# Patient Record
Sex: FEMALE | Race: OTHER | HISPANIC OR LATINO | URBAN - METROPOLITAN AREA
[De-identification: names, ages, dates, MRNs, and addresses within clinical notes are randomized per-mention and may not be internally consistent; named-entity substitution may affect disease eponyms.]

---

## 2017-04-16 ENCOUNTER — EMERGENCY (EMERGENCY)
Facility: HOSPITAL | Age: 57
LOS: 1 days | Discharge: HOME | End: 2017-04-16
Admitting: EMERGENCY MEDICINE
Payer: COMMERCIAL

## 2017-04-16 PROCEDURE — 99285 EMERGENCY DEPT VISIT HI MDM: CPT

## 2017-04-16 PROCEDURE — 71010: CPT | Mod: 26

## 2017-04-16 PROCEDURE — 93010 ELECTROCARDIOGRAM REPORT: CPT

## 2022-11-22 ENCOUNTER — OFFICE (OUTPATIENT)
Dept: URBAN - METROPOLITAN AREA CLINIC 63 | Facility: CLINIC | Age: 62
Setting detail: OPHTHALMOLOGY
End: 2022-11-22
Payer: MEDICAID

## 2022-11-22 DIAGNOSIS — H52.4: ICD-10-CM

## 2022-11-22 DIAGNOSIS — H02.831: ICD-10-CM

## 2022-11-22 DIAGNOSIS — H25.13: ICD-10-CM

## 2022-11-22 DIAGNOSIS — H02.834: ICD-10-CM

## 2022-11-22 DIAGNOSIS — H40.013: ICD-10-CM

## 2022-11-22 PROCEDURE — 92004 COMPRE OPH EXAM NEW PT 1/>: CPT | Performed by: STUDENT IN AN ORGANIZED HEALTH CARE EDUCATION/TRAINING PROGRAM

## 2022-11-22 PROCEDURE — 92133 CPTRZD OPH DX IMG PST SGM ON: CPT | Performed by: STUDENT IN AN ORGANIZED HEALTH CARE EDUCATION/TRAINING PROGRAM

## 2022-11-22 PROCEDURE — 92015 DETERMINE REFRACTIVE STATE: CPT | Performed by: STUDENT IN AN ORGANIZED HEALTH CARE EDUCATION/TRAINING PROGRAM

## 2022-11-22 ASSESSMENT — REFRACTION_AUTOREFRACTION
OS_CYLINDER: -0.25
OD_AXIS: 096
OD_CYLINDER: -1.00
OS_AXIS: 134
OS_SPHERE: +3.75
OD_SPHERE: +4.25

## 2022-11-22 ASSESSMENT — AXIALLENGTH_DERIVED
OS_AL: 22.6809
OS_AL: 22.5912
OD_AL: 22.6334
OD_AL: 22.5887

## 2022-11-22 ASSESSMENT — REFRACTION_MANIFEST
OD_VA1: 20/20
OD_CYLINDER: -0.75
OS_SPHERE: +3.50
OS_AXIS: 135
OS_CYLINDER: -0.25
OD_ADD: +2.50
OS_VA1: 20/20
OU_VA: 20/20
OS_ADD: +2.50
OD_AXIS: 095
OD_SPHERE: +4.00

## 2022-11-22 ASSESSMENT — KERATOMETRY
OS_AXISANGLE_DEGREES: 026
OD_K2POWER_DIOPTERS: 42.75
OS_K2POWER_DIOPTERS: 42.75
OD_AXISANGLE_DEGREES: 042
OD_K1POWER_DIOPTERS: 42.00
OS_K1POWER_DIOPTERS: 42.25

## 2022-11-22 ASSESSMENT — REFRACTION_CURRENTRX
OD_ADD: +2.00
OD_AXIS: 102
OS_OVR_VA: 20/
OD_OVR_VA: 20/
OS_SPHERE: +4.25
OD_SPHERE: +4.00
OS_AXIS: 083
OS_ADD: +2.00
OS_CYLINDER: -0.75
OD_CYLINDER: -0.75

## 2022-11-22 ASSESSMENT — LID POSITION - DERMATOCHALASIS
OS_DERMATOCHALASIS: LUL 2+
OD_DERMATOCHALASIS: RUL 2+

## 2022-11-22 ASSESSMENT — CONFRONTATIONAL VISUAL FIELD TEST (CVF)
OD_FINDINGS: FULL
OS_FINDINGS: FULL

## 2022-11-22 ASSESSMENT — SPHEQUIV_DERIVED
OS_SPHEQUIV: 3.375
OS_SPHEQUIV: 3.625
OD_SPHEQUIV: 3.75
OD_SPHEQUIV: 3.625

## 2022-11-22 ASSESSMENT — VISUAL ACUITY
OS_BCVA: 20/20
OD_BCVA: 20/20

## 2022-11-22 ASSESSMENT — TONOMETRY
OD_IOP_MMHG: 18
OS_IOP_MMHG: 17

## 2023-02-07 ENCOUNTER — APPOINTMENT (OUTPATIENT)
Dept: ORTHOPEDIC SURGERY | Facility: CLINIC | Age: 63
End: 2023-02-07
Payer: MEDICAID

## 2023-02-07 VITALS
SYSTOLIC BLOOD PRESSURE: 160 MMHG | DIASTOLIC BLOOD PRESSURE: 85 MMHG | HEIGHT: 63 IN | WEIGHT: 174 LBS | TEMPERATURE: 97.9 F | HEART RATE: 83 BPM | BODY MASS INDEX: 30.83 KG/M2

## 2023-02-07 PROCEDURE — 73080 X-RAY EXAM OF ELBOW: CPT | Mod: RT

## 2023-02-07 PROCEDURE — 99205 OFFICE O/P NEW HI 60 MIN: CPT

## 2023-02-07 NOTE — CONSULT LETTER
[Dear  ___] : Dear  [unfilled], [Consult Letter:] : I had the pleasure of evaluating your patient, [unfilled]. [Please see my note below.] : Please see my note below. [Consult Closing:] : Thank you very much for allowing me to participate in the care of this patient.  If you have any questions, please do not hesitate to contact me. [Sincerely,] : Sincerely, [FreeTextEntry2] : Quinton Hawley MD\par [address] [place] [address], [address]\[place] [address]\par  [FreeTextEntry3] : Burton Watt MD

## 2023-02-07 NOTE — ASSESSMENT
[FreeTextEntry1] : ASSESSMENT:\par \par The patient comes in today with chronic exacerbated symptoms of right proximal forearm mass that is causing significant physical and emotional discomfort to her.\par [I have diagnosed the patient today with a new diagnosis - This may diminish bodily function for the extremity.] \par \par [For this I was able to review other physician’s note(s) including reviewing other tests, imaging results as well as personally view these results for my own interpretation.]\par \par The patient was adequately and thoroughly informed of my assessment of their current condition(s). \par \par Surgical discussion\par \par We had a thorough discussion regarding her current symptoms and complaints.  She has a growing mass that she wishes to be removed.  We discussed removal of the mass versus observing it.  At this point she would like for it to be removed.  I would remove it but also I mentioned sending it to the pathologist.  We talked about benign versus malignant lesions.  At this point we also talked about the need for amputation and other limb salvage surgeries if this turns out to be a malignant lesion. \par \par We talked about recurrence of the mass as a possibility.  We talked about the need for secondary surgery. \par \par \par Surgical Consent Discussion:\par \par I explained the risks and benefits of surgical intervention to the patient. The risks include, but are not limited to: pain, infection, swelling, stiffness, injury to underlying neurovascular structures, scar sensitivity, incomplete resolution of symptoms, the possibility of the need for future surgery and finally the need to comply with a post-operative occupational therapy protocol. The patient understands, agrees and informed consent was obtained. The patient’s surgery will be scheduled in the near future.\par \par DISCUSSION:\par 1.  She has elected to proceed with right proximal forearm mass excisional biopsy\par \par

## 2023-02-07 NOTE — PHYSICAL EXAM
[de-identified] : She is well-appearing on examination\par \par Examination of the right forearm adjacent to the elbow reveals a palpable lesion at the posterior aspect of the proximal ulna which appears deep and rubbery.  This causes severe discomfort upon even slight touch. [de-identified] : 3 views of right elbow were obtained today in my office and were seen by me and discussed with the patient. \par These show findings consistent with mild ulnohumeral osteoarthritis and posterior olecranon traction spur\par \par I have reviewed an outside MRI or MRI ordered by a separate provider denoting a right proximal forearm lesion that appears homogenous\par

## 2023-02-07 NOTE — HISTORY OF PRESENT ILLNESS
[FreeTextEntry1] : Josefina is a very pleasant 62-year-old female who has been struggling with worsening bright elbow and forearm pain for greater than a year now.  She complains of a growing mass that is exquisitely tender for her.

## 2023-03-16 ENCOUNTER — OUTPATIENT (OUTPATIENT)
Dept: OUTPATIENT SERVICES | Facility: HOSPITAL | Age: 63
LOS: 1 days | End: 2023-03-16
Payer: MEDICAID

## 2023-03-16 DIAGNOSIS — Z01.818 ENCOUNTER FOR OTHER PREPROCEDURAL EXAMINATION: ICD-10-CM

## 2023-03-16 LAB
A1C WITH ESTIMATED AVERAGE GLUCOSE RESULT: 5.9 % — HIGH (ref 4–5.6)
ANION GAP SERPL CALC-SCNC: 9 MMOL/L — SIGNIFICANT CHANGE UP (ref 5–17)
APTT BLD: 28.3 SEC — SIGNIFICANT CHANGE UP (ref 27.5–35.5)
BASOPHILS # BLD AUTO: 0.03 K/UL — SIGNIFICANT CHANGE UP (ref 0–0.2)
BASOPHILS NFR BLD AUTO: 0.5 % — SIGNIFICANT CHANGE UP (ref 0–2)
BUN SERPL-MCNC: 22.3 MG/DL — HIGH (ref 8–20)
CALCIUM SERPL-MCNC: 9.5 MG/DL — SIGNIFICANT CHANGE UP (ref 8.4–10.5)
CHLORIDE SERPL-SCNC: 105 MMOL/L — SIGNIFICANT CHANGE UP (ref 96–108)
CO2 SERPL-SCNC: 27 MMOL/L — SIGNIFICANT CHANGE UP (ref 22–29)
CREAT SERPL-MCNC: 0.81 MG/DL — SIGNIFICANT CHANGE UP (ref 0.5–1.3)
EGFR: 82 ML/MIN/1.73M2 — SIGNIFICANT CHANGE UP
EOSINOPHIL # BLD AUTO: 0.07 K/UL — SIGNIFICANT CHANGE UP (ref 0–0.5)
EOSINOPHIL NFR BLD AUTO: 1.2 % — SIGNIFICANT CHANGE UP (ref 0–6)
ESTIMATED AVERAGE GLUCOSE: 123 MG/DL — HIGH (ref 68–114)
GLUCOSE SERPL-MCNC: 106 MG/DL — HIGH (ref 70–99)
HCT VFR BLD CALC: 39.1 % — SIGNIFICANT CHANGE UP (ref 34.5–45)
HGB BLD-MCNC: 12.9 G/DL — SIGNIFICANT CHANGE UP (ref 11.5–15.5)
IMM GRANULOCYTES NFR BLD AUTO: 0.2 % — SIGNIFICANT CHANGE UP (ref 0–0.9)
INR BLD: 0.98 RATIO — SIGNIFICANT CHANGE UP (ref 0.88–1.16)
LYMPHOCYTES # BLD AUTO: 2.24 K/UL — SIGNIFICANT CHANGE UP (ref 1–3.3)
LYMPHOCYTES # BLD AUTO: 39.1 % — SIGNIFICANT CHANGE UP (ref 13–44)
MCHC RBC-ENTMCNC: 30.1 PG — SIGNIFICANT CHANGE UP (ref 27–34)
MCHC RBC-ENTMCNC: 33 GM/DL — SIGNIFICANT CHANGE UP (ref 32–36)
MCV RBC AUTO: 91.4 FL — SIGNIFICANT CHANGE UP (ref 80–100)
MONOCYTES # BLD AUTO: 0.34 K/UL — SIGNIFICANT CHANGE UP (ref 0–0.9)
MONOCYTES NFR BLD AUTO: 5.9 % — SIGNIFICANT CHANGE UP (ref 2–14)
NEUTROPHILS # BLD AUTO: 3.04 K/UL — SIGNIFICANT CHANGE UP (ref 1.8–7.4)
NEUTROPHILS NFR BLD AUTO: 53.1 % — SIGNIFICANT CHANGE UP (ref 43–77)
PLATELET # BLD AUTO: 295 K/UL — SIGNIFICANT CHANGE UP (ref 150–400)
POTASSIUM SERPL-MCNC: 4.2 MMOL/L — SIGNIFICANT CHANGE UP (ref 3.5–5.3)
POTASSIUM SERPL-SCNC: 4.2 MMOL/L — SIGNIFICANT CHANGE UP (ref 3.5–5.3)
PROTHROM AB SERPL-ACNC: 11.4 SEC — SIGNIFICANT CHANGE UP (ref 10.5–13.4)
RBC # BLD: 4.28 M/UL — SIGNIFICANT CHANGE UP (ref 3.8–5.2)
RBC # FLD: 12.4 % — SIGNIFICANT CHANGE UP (ref 10.3–14.5)
SODIUM SERPL-SCNC: 141 MMOL/L — SIGNIFICANT CHANGE UP (ref 135–145)
WBC # BLD: 5.73 K/UL — SIGNIFICANT CHANGE UP (ref 3.8–10.5)
WBC # FLD AUTO: 5.73 K/UL — SIGNIFICANT CHANGE UP (ref 3.8–10.5)

## 2023-03-16 PROCEDURE — G0463: CPT

## 2023-03-16 PROCEDURE — 85730 THROMBOPLASTIN TIME PARTIAL: CPT

## 2023-03-16 PROCEDURE — 80048 BASIC METABOLIC PNL TOTAL CA: CPT

## 2023-03-16 PROCEDURE — 85610 PROTHROMBIN TIME: CPT

## 2023-03-16 PROCEDURE — 36415 COLL VENOUS BLD VENIPUNCTURE: CPT

## 2023-03-16 PROCEDURE — 83036 HEMOGLOBIN GLYCOSYLATED A1C: CPT

## 2023-03-16 PROCEDURE — 93010 ELECTROCARDIOGRAM REPORT: CPT

## 2023-03-16 PROCEDURE — 93005 ELECTROCARDIOGRAM TRACING: CPT

## 2023-03-16 PROCEDURE — 85025 COMPLETE CBC W/AUTO DIFF WBC: CPT

## 2023-03-27 ENCOUNTER — RESULT REVIEW (OUTPATIENT)
Age: 63
End: 2023-03-27

## 2023-03-27 ENCOUNTER — APPOINTMENT (OUTPATIENT)
Dept: ORTHOPEDIC SURGERY | Facility: AMBULATORY SURGERY CENTER | Age: 63
End: 2023-03-27
Payer: MEDICAID

## 2023-03-27 PROCEDURE — 25076 EXC FOREARM TUM DEEP < 3 CM: CPT | Mod: RT

## 2023-04-10 ENCOUNTER — APPOINTMENT (OUTPATIENT)
Dept: ORTHOPEDIC SURGERY | Facility: CLINIC | Age: 63
End: 2023-04-10
Payer: MEDICAID

## 2023-04-10 VITALS — WEIGHT: 174 LBS | BODY MASS INDEX: 32.02 KG/M2 | HEIGHT: 62 IN

## 2023-04-10 DIAGNOSIS — M79.603 PAIN IN ARM, UNSPECIFIED: ICD-10-CM

## 2023-04-10 PROCEDURE — 99024 POSTOP FOLLOW-UP VISIT: CPT

## 2023-04-10 NOTE — HISTORY OF PRESENT ILLNESS
[de-identified] : S/P Right proximal forearm posterior 2 cm mass excision biopsy, DOS: 3/27/23. [de-identified] : Josefina returns for postoperative follow-up.  She is delighted and tells me that her preoperative pain is gone away entirely. [de-identified] : She is well-appearing on examination.  Examination of the right proximal forearm incision reveals excellent signs of healing without any infection.  She has good elbow and wrist motion as well as digital motion [de-identified] : We are both delighted with the results.  I have notified her of the findings on pathologic assessment.  Although glomus tumor can return and it is unlikely.  She is delighted that she has no pain whatsoever.  For now she has elected for occupational therapy which should help with optimizing the outcome [de-identified] : 1.  She will commence OT and we will see each other again in 4 weeks

## 2023-05-19 ENCOUNTER — APPOINTMENT (OUTPATIENT)
Dept: ORTHOPEDIC SURGERY | Facility: CLINIC | Age: 63
End: 2023-05-19

## 2024-04-05 ENCOUNTER — APPOINTMENT (OUTPATIENT)
Dept: RHEUMATOLOGY | Facility: CLINIC | Age: 64
End: 2024-04-05
Payer: COMMERCIAL

## 2024-04-05 VITALS
HEIGHT: 63 IN | OXYGEN SATURATION: 98 % | WEIGHT: 174 LBS | BODY MASS INDEX: 30.83 KG/M2 | HEART RATE: 86 BPM | TEMPERATURE: 98 F | RESPIRATION RATE: 17 BRPM

## 2024-04-05 DIAGNOSIS — R20.2 ANESTHESIA OF SKIN: ICD-10-CM

## 2024-04-05 DIAGNOSIS — Z78.0 ASYMPTOMATIC MENOPAUSAL STATE: ICD-10-CM

## 2024-04-05 DIAGNOSIS — M75.82 OTHER SHOULDER LESIONS, LEFT SHOULDER: ICD-10-CM

## 2024-04-05 DIAGNOSIS — Z78.9 OTHER SPECIFIED HEALTH STATUS: ICD-10-CM

## 2024-04-05 DIAGNOSIS — M70.61 TROCHANTERIC BURSITIS, RIGHT HIP: ICD-10-CM

## 2024-04-05 DIAGNOSIS — Z91.09 OTHER ALLERGY STATUS, OTHER THAN TO DRUGS AND BIOLOGICAL SUBSTANCES: ICD-10-CM

## 2024-04-05 DIAGNOSIS — M15.1 HEBERDEN'S NODES (WITH ARTHROPATHY): ICD-10-CM

## 2024-04-05 DIAGNOSIS — K59.00 CONSTIPATION, UNSPECIFIED: ICD-10-CM

## 2024-04-05 DIAGNOSIS — R20.0 ANESTHESIA OF SKIN: ICD-10-CM

## 2024-04-05 DIAGNOSIS — Z63.5 DISRUPTION OF FAMILY BY SEPARATION AND DIVORCE: ICD-10-CM

## 2024-04-05 PROCEDURE — 20610 DRAIN/INJ JOINT/BURSA W/O US: CPT | Mod: RT

## 2024-04-05 PROCEDURE — 99204 OFFICE O/P NEW MOD 45 MIN: CPT | Mod: 25

## 2024-04-05 RX ORDER — METHYLPRED ACET/NACL,ISO-OS/PF 40 MG/ML
40 VIAL (ML) INJECTION
Qty: 0 | Refills: 0 | Status: COMPLETED | OUTPATIENT
Start: 2024-04-05

## 2024-04-05 RX ORDER — DICLOFENAC SODIUM 1% 10 MG/G
1 GEL TOPICAL DAILY
Qty: 1 | Refills: 3 | Status: ACTIVE | COMMUNITY
Start: 2024-04-05 | End: 1900-01-01

## 2024-04-05 RX ADMIN — METHYLPREDNISOLONE ACETATE 0 MG/ML: 40 INJECTION, SUSPENSION INTRA-ARTICULAR; INTRALESIONAL; INTRAMUSCULAR; SOFT TISSUE at 00:00

## 2024-04-05 SDOH — SOCIAL STABILITY - SOCIAL INSECURITY: DISRUPTION OF FAMILY BY SEPARATION AND DIVORCE: Z63.5

## 2024-04-25 ENCOUNTER — APPOINTMENT (OUTPATIENT)
Dept: RHEUMATOLOGY | Facility: CLINIC | Age: 64
End: 2024-04-25

## 2024-08-09 ENCOUNTER — APPOINTMENT (OUTPATIENT)
Dept: RHEUMATOLOGY | Facility: CLINIC | Age: 64
End: 2024-08-09

## 2024-08-09 PROCEDURE — 20610 DRAIN/INJ JOINT/BURSA W/O US: CPT | Mod: RT

## 2024-08-09 PROCEDURE — 99213 OFFICE O/P EST LOW 20 MIN: CPT | Mod: 25

## 2024-08-09 NOTE — PHYSICAL EXAM
[General Appearance - Well Nourished] : well nourished [General Appearance - Well Developed] : well developed [Sclera] : the sclera and conjunctiva were normal [Hearing Threshold Finger Rub Not 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

## 2024-08-09 NOTE — PHYSICAL EXAM
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

## 2024-08-09 NOTE — HISTORY OF PRESENT ILLNESS
[FreeTextEntry1] : 64 yo woman with history of left shoulder pain and R lateral hip pain.  she also has hand pain involving her DIP and some PIPs.  she has extensive Heberden nodes with occasional swelling but no erythema.    hand pain worse with to much work with her hands.  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  pain radiates to left lateral arm.  does not recall trauma to shoulder.  She shoulder injection last visit with improvement.  Shoulder pain worse with excessive work. she takes tylenol with mild pain relief.  she has been doing her own PT from online video and has notice some improvement and improved ROM.  she had xray shoulder which was normal.  Right lateral hip pain when she sits for too long and attempts to stand.  she sleeps on her sides HIp xray normal. Right GTB injection improved pain.    patient denies any morning stiffness and no joint swelling.   in the past had cervical pain. 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 Hemangioma at T2.  denies any radicular pain   denies any alopecia, oral lesions, persistent dry eye or dry mouth, red painful eye, nose bleeding, dysphagia, hoarseness, facial rashes, photosensitivity, sob, cough, cp, hx of serositis, hx low wbc, plts or anemia that required special treatment, Gi issues, raynauds, weakness, DVt/PE, miscarriages    denies psoriasis, nail changes, sauage digits, tennis elbow, de quervain, plantar facitis, achilles pain,  back pain, FH of psoriiasis, UC or crohns

## 2024-08-09 NOTE — ASSESSMENT
[FreeTextEntry1] : 62 yo woman with no known PMH presents with DIP OA and left rotator cuff tendinopathy and right GTB.  --patient wants to repeat steoid  injection to Right GTB and to left shoulder  --she is to start PT  --she can use voltaren gel for hand pain

## 2024-08-09 NOTE — PROCEDURE
[Today's Date:] : Date: [unfilled] [Patient] : the patient [Consent Obtained] : written consent was obtained prior to the procedure and is detailed in the patient's record [Therapeutic] : therapeutic [#1 Site: ______] : #1 site identified in the [unfilled] [Ethyl Chloride] : ethyl chloride [Alcohol] : alcohol [25 gauge 1.5 inch] : A 25 gauge 1.5 inch needle was used [___ml 1% Lidocaine] : [unfilled] ml of 1% lidocaine [Depomedrol ___ mg] : Depomedrol [unfilled] mg [Tolerated Well] : the patient tolerated the procedure well [Reports Improvement in Symptoms] : reports improvement in symptoms [No Complications] : there were no complications [Instructions Given] : handouts/patient instructions were given to patient [#2 Site: ___] : # 2 site identified in the [unfilled]

## 2024-08-19 ENCOUNTER — NON-APPOINTMENT (OUTPATIENT)
Age: 64
End: 2024-08-19

## 2024-08-19 DIAGNOSIS — M79.674 PAIN IN RIGHT TOE(S): ICD-10-CM

## 2024-08-19 DIAGNOSIS — L03.032 CELLULITIS OF LEFT TOE: ICD-10-CM

## 2024-08-19 DIAGNOSIS — Z87.39 PERSONAL HISTORY OF OTHER DISEASES OF THE MUSCULOSKELETAL SYSTEM AND CONNECTIVE TISSUE: ICD-10-CM

## 2024-08-19 DIAGNOSIS — M79.675 PAIN IN LEFT TOE(S): ICD-10-CM

## 2024-08-19 DIAGNOSIS — I73.89 OTHER SPECIFIED PERIPHERAL VASCULAR DISEASES: ICD-10-CM

## 2024-08-19 DIAGNOSIS — B35.1 TINEA UNGUIUM: ICD-10-CM

## 2024-08-19 RX ORDER — KETOCONAZOLE 20 MG/G
2 CREAM TOPICAL
Refills: 0 | Status: ACTIVE | COMMUNITY

## 2024-09-06 ENCOUNTER — APPOINTMENT (OUTPATIENT)
Age: 64
End: 2024-09-06

## 2025-01-09 ENCOUNTER — APPOINTMENT (OUTPATIENT)
Dept: RHEUMATOLOGY | Facility: CLINIC | Age: 65
End: 2025-01-09

## 2025-06-11 ENCOUNTER — NON-APPOINTMENT (OUTPATIENT)
Age: 65
End: 2025-06-11